# Patient Record
Sex: FEMALE | URBAN - METROPOLITAN AREA
[De-identification: names, ages, dates, MRNs, and addresses within clinical notes are randomized per-mention and may not be internally consistent; named-entity substitution may affect disease eponyms.]

---

## 2019-11-12 ENCOUNTER — OFFICE VISIT (OUTPATIENT)
Dept: ORTHOPEDIC SURGERY | Facility: CLINIC | Age: 34
End: 2019-11-12

## 2019-11-12 VITALS
WEIGHT: 143 LBS | OXYGEN SATURATION: 100 % | HEART RATE: 67 BPM | TEMPERATURE: 98.1 F | DIASTOLIC BLOOD PRESSURE: 65 MMHG | BODY MASS INDEX: 23.82 KG/M2 | SYSTOLIC BLOOD PRESSURE: 115 MMHG | RESPIRATION RATE: 16 BRPM | HEIGHT: 65 IN

## 2019-11-12 DIAGNOSIS — M79.645 PAIN OF FINGER OF LEFT HAND: ICD-10-CM

## 2019-11-12 DIAGNOSIS — M65.332 TRIGGER MIDDLE FINGER OF LEFT HAND: Primary | ICD-10-CM

## 2019-11-12 RX ORDER — NORGESTIMATE AND ETHINYL ESTRADIOL 7DAYSX3 LO
KIT ORAL
COMMUNITY

## 2019-11-12 RX ORDER — SERTRALINE HYDROCHLORIDE 50 MG/1
TABLET, FILM COATED ORAL DAILY
COMMUNITY

## 2024-11-05 ENCOUNTER — OFFICE VISIT (OUTPATIENT)
Age: 39
End: 2024-11-05

## 2024-11-05 VITALS — HEIGHT: 66 IN | RESPIRATION RATE: 16 BRPM

## 2024-11-05 DIAGNOSIS — M25.512 ACUTE PAIN OF LEFT SHOULDER: ICD-10-CM

## 2024-11-05 DIAGNOSIS — M54.2 CERVICALGIA: Primary | ICD-10-CM

## 2024-11-05 RX ORDER — LIDOCAINE HYDROCHLORIDE 10 MG/ML
3 INJECTION, SOLUTION INFILTRATION; PERINEURAL ONCE
Status: SHIPPED | OUTPATIENT
Start: 2024-11-05

## 2024-11-05 RX ORDER — TRIAMCINOLONE ACETONIDE 40 MG/ML
40 INJECTION, SUSPENSION INTRA-ARTICULAR; INTRAMUSCULAR ONCE
Status: SHIPPED | OUTPATIENT
Start: 2024-11-05

## 2024-11-05 RX ORDER — MELOXICAM 15 MG/1
15 TABLET ORAL DAILY
Qty: 30 TABLET | Refills: 3 | Status: SHIPPED | OUTPATIENT
Start: 2024-11-05

## 2024-11-05 NOTE — PROGRESS NOTES
[facility] AMBULATORY OFFICE NOTE      Patient: Nati Menjivar                MRN: [id number]       SSN: [id number]  YOB: 1985        AGE: 39 y.o.        SEX: female  There is no height or weight on file to calculate BMI.    PCP: No primary care provider on file.  11/05/24    CHIEF COMPLAINT: Left sided neck pain    HPI: Nati Menjivar is a 39 y.o. female patient who complains of several years of left-sided neck pain.  No injury or trauma but she does a job where she frequently has her neck flexed forward.  She is noted some strain of the left side of her neck that occasionally radiates pain into her shoulder blade but most frequently this pain is along the left side of the posterior neck and musculature along the neck.  She takes ibuprofen as needed which she says does not help.  No history of therapy, injections, or surgery.    History reviewed. No pertinent past medical history.    History reviewed. No pertinent family history.    Current Outpatient Medications   Medication Sig Dispense Refill    diclofenac sodium (VOLTAREN) 1 % GEL Apply 2 g topically 4 times daily 100 g 2    meloxicam (MOBIC) 15 MG tablet Take 1 tablet by mouth daily 30 tablet 3     Current Facility-Administered Medications   Medication Dose Route Frequency Provider Last Rate Last Admin    triamcinolone acetonide (KENALOG-40) injection 40 mg  40 mg Other Once         lidocaine 1 % injection 3 mL  3 mL Other Once            Not on File    History reviewed. No pertinent surgical history.    Social History     Socioeconomic History    Marital status: Unknown     Spouse name: Not on file    Number of children: Not on file    Years of education: Not on file    Highest education level: Not on file   Occupational History    Not on file   Tobacco Use    Smoking status: Never    Smokeless tobacco: Never   Substance and Sexual Activity    Alcohol use: Not on file    Drug use: Not on file    Sexual activity: